# Patient Record
Sex: FEMALE | Race: WHITE | NOT HISPANIC OR LATINO | URBAN - METROPOLITAN AREA
[De-identification: names, ages, dates, MRNs, and addresses within clinical notes are randomized per-mention and may not be internally consistent; named-entity substitution may affect disease eponyms.]

---

## 2024-09-16 ENCOUNTER — APPOINTMENT (RX ONLY)
Dept: URBAN - METROPOLITAN AREA CLINIC 148 | Facility: CLINIC | Age: 74
Setting detail: DERMATOLOGY
End: 2024-09-16

## 2024-09-16 DIAGNOSIS — L82.1 OTHER SEBORRHEIC KERATOSIS: ICD-10-CM

## 2024-09-16 DIAGNOSIS — Z85.828 PERSONAL HISTORY OF OTHER MALIGNANT NEOPLASM OF SKIN: ICD-10-CM

## 2024-09-16 PROCEDURE — ? NOTED ON EXAM BUT NOT TREATED

## 2024-09-16 PROCEDURE — ? ADDITIONAL NOTES

## 2024-09-16 PROCEDURE — ? COUNSELING

## 2024-09-16 PROCEDURE — 99213 OFFICE O/P EST LOW 20 MIN: CPT

## 2024-09-16 ASSESSMENT — LOCATION ZONE DERM: LOCATION ZONE: LEG

## 2024-09-16 ASSESSMENT — LOCATION SIMPLE DESCRIPTION DERM: LOCATION SIMPLE: RIGHT POPLITEAL SKIN

## 2024-09-16 ASSESSMENT — LOCATION DETAILED DESCRIPTION DERM: LOCATION DETAILED: RIGHT POPLITEAL SKIN

## 2024-09-16 NOTE — PROCEDURE: ADDITIONAL NOTES
Render Risk Assessment In Note?: no
Detail Level: Generalized
Additional Notes: Pt declines any treatment on SK today

## 2024-09-16 NOTE — PROCEDURE: NOTED ON EXAM BUT NOT TREATED
Detail Level: Zone
Text: The above diagnosis and findings were noted on the exam but no treatment is needed at this time. Discussed ketoconazole shampoo.

## 2025-07-02 ENCOUNTER — APPOINTMENT (OUTPATIENT)
Dept: URBAN - METROPOLITAN AREA CLINIC 148 | Facility: CLINIC | Age: 75
Setting detail: DERMATOLOGY
End: 2025-07-02

## 2025-07-02 DIAGNOSIS — Z85.828 PERSONAL HISTORY OF OTHER MALIGNANT NEOPLASM OF SKIN: ICD-10-CM

## 2025-07-02 DIAGNOSIS — D69.2 OTHER NONTHROMBOCYTOPENIC PURPURA: ICD-10-CM

## 2025-07-02 PROCEDURE — ? COUNSELING

## 2025-07-02 PROCEDURE — ?

## 2025-07-02 ASSESSMENT — LOCATION DETAILED DESCRIPTION DERM: LOCATION DETAILED: LEFT ANTERIOR PROXIMAL UPPER ARM

## 2025-07-02 ASSESSMENT — LOCATION SIMPLE DESCRIPTION DERM: LOCATION SIMPLE: LEFT UPPER ARM

## 2025-07-02 ASSESSMENT — LOCATION ZONE DERM: LOCATION ZONE: ARM

## 2025-07-02 NOTE — PROCEDURE: COUNSELING
Detail Level: Detailed
Detail Level: Simple
Patient Specific Counseling (Will Not Stick From Patient To Patient): From pt's BP Cuff